# Patient Record
Sex: FEMALE | Race: WHITE | Employment: FULL TIME | URBAN - NONMETROPOLITAN AREA
[De-identification: names, ages, dates, MRNs, and addresses within clinical notes are randomized per-mention and may not be internally consistent; named-entity substitution may affect disease eponyms.]

---

## 2021-07-15 ENCOUNTER — TELEPHONE (OUTPATIENT)
Dept: OBGYN CLINIC | Age: 26
End: 2021-07-15

## 2021-07-15 ENCOUNTER — OFFICE VISIT (OUTPATIENT)
Dept: OBGYN CLINIC | Age: 26
End: 2021-07-15
Payer: COMMERCIAL

## 2021-07-15 VITALS — WEIGHT: 143.2 LBS | DIASTOLIC BLOOD PRESSURE: 78 MMHG | SYSTOLIC BLOOD PRESSURE: 124 MMHG

## 2021-07-15 DIAGNOSIS — N94.6 DYSMENORRHEA: ICD-10-CM

## 2021-07-15 DIAGNOSIS — N92.1 MENORRHAGIA WITH IRREGULAR CYCLE: Primary | ICD-10-CM

## 2021-07-15 PROCEDURE — 1036F TOBACCO NON-USER: CPT | Performed by: NURSE PRACTITIONER

## 2021-07-15 PROCEDURE — 99203 OFFICE O/P NEW LOW 30 MIN: CPT | Performed by: NURSE PRACTITIONER

## 2021-07-15 PROCEDURE — G8427 DOCREV CUR MEDS BY ELIG CLIN: HCPCS | Performed by: NURSE PRACTITIONER

## 2021-07-15 PROCEDURE — G8421 BMI NOT CALCULATED: HCPCS | Performed by: NURSE PRACTITIONER

## 2021-07-15 RX ORDER — OXYBUTYNIN CHLORIDE 15 MG/1
TABLET, EXTENDED RELEASE ORAL
COMMUNITY
Start: 2021-06-11

## 2021-07-15 RX ORDER — LEVONORGESTREL / ETHINYL ESTRADIOL 0.15-0.03
KIT ORAL
COMMUNITY
Start: 2021-07-06 | End: 2021-08-10 | Stop reason: ALTCHOICE

## 2021-07-15 NOTE — PROGRESS NOTES
PROBLEM VISIT     Date of service: 7/15/2021    Eliza Ramsey  Is a 23 y.o.  female    PT's PCP is: No primary care provider on file. : 1995                                             Subjective:       Patient's last menstrual period was 2021. OB History    Para Term  AB Living   0 0 0 0 0 0   SAB TAB Ectopic Molar Multiple Live Births   0 0 0 0 0 0        Social History     Tobacco Use   Smoking Status Never Smoker   Smokeless Tobacco Never Used        Social History     Substance and Sexual Activity   Alcohol Use Yes    Comment: occ       Allergies: Penicillins      Current Outpatient Medications:     oxybutynin (DITROPAN XL) 15 MG extended release tablet, TAKE 1 TABLET BY MOUTH ONCE DAILY, Disp: , Rfl:   Senora Gills 0.15-0.03 MG per tablet, TAKE 1 TABLET BY MOUTH ONCE DAILY, Disp: , Rfl:     Social History     Substance and Sexual Activity   Sexual Activity Yes    Partners: Male    Birth control/protection: OCP     Chief Complaint   Patient presents with    Other     Discuss Mirena IUD for cycle control. Reports menses every 2 mos and heavy bleeding without OCP. PE:  Vital Signs  Blood pressure 124/78, weight 143 lb 3.2 oz (65 kg), last menstrual period 2021. HPI: Patient presents today to discuss Mirena IUD for cycle control. Reports without OCP menses is irregular and heavy. PT denies fever, chills, nausea and vomiting       Review of Systems   Constitutional: Negative. Genitourinary: Positive for menstrual problem. Negative for dyspareunia, dysuria, frequency, pelvic pain, vaginal bleeding and vaginal discharge. Physical Exam  Constitutional:       Appearance: Normal appearance. HENT:      Head: Normocephalic. Pulmonary:      Effort: Pulmonary effort is normal.   Musculoskeletal:         General: Normal range of motion. Neurological:      General: No focal deficit present. Mental Status: She is alert.    Psychiatric: Mood and Affect: Mood normal.         Behavior: Behavior normal.         Assessment and Plan          Diagnosis Orders   1. Menorrhagia with irregular cycle  hCG, Quantitative, Pregnancy   2. Dysmenorrhea         Discussed Mirena IUD- MOA, common side effects and bleeding patterns, risks/benefits, procedure. She is aware she will need hcg the day prior to menses and will plan for insert day 5-9 of cycle. I am having Shannan Garza maintain her oxybutynin and Jolessa. Return for IUD insert. There are no Patient Instructions on file for this visit.     PRINCE Villar NP,7/15/2021 3:10 PM

## 2021-07-26 NOTE — TELEPHONE ENCOUNTER
Prescription Request Form faxed to [facility] with confirmation of fax received.

## 2021-08-02 NOTE — TELEPHONE ENCOUNTER
Pt returned call and is aware to call office on cycle day 1 to schedule insertion. She is also aware of need for SPT the day prior to insertion.

## 2021-08-10 ENCOUNTER — PROCEDURE VISIT (OUTPATIENT)
Dept: OBGYN CLINIC | Age: 26
End: 2021-08-10
Payer: COMMERCIAL

## 2021-08-10 ENCOUNTER — HOSPITAL ENCOUNTER (OUTPATIENT)
Age: 26
Setting detail: SPECIMEN
Discharge: HOME | End: 2021-08-10
Payer: COMMERCIAL

## 2021-08-10 VITALS — WEIGHT: 143.8 LBS | DIASTOLIC BLOOD PRESSURE: 78 MMHG | SYSTOLIC BLOOD PRESSURE: 120 MMHG

## 2021-08-10 DIAGNOSIS — N92.1 MENORRHAGIA WITH IRREGULAR CYCLE: Primary | ICD-10-CM

## 2021-08-10 DIAGNOSIS — N94.6 DYSMENORRHEA: ICD-10-CM

## 2021-08-10 DIAGNOSIS — Z11.3 SCREEN FOR STD (SEXUALLY TRANSMITTED DISEASE): Primary | ICD-10-CM

## 2021-08-10 DIAGNOSIS — Z30.430 ENCOUNTER FOR INSERTION OF INTRAUTERINE CONTRACEPTIVE DEVICE (IUD): ICD-10-CM

## 2021-08-10 PROCEDURE — 58300 INSERT INTRAUTERINE DEVICE: CPT | Performed by: NURSE PRACTITIONER

## 2021-08-10 NOTE — PROGRESS NOTES
The patient is a 23 y.o. female that presents for IUD insertion for menorrhagia with irregular cycle and dysmenorrhea     OB History        0    Para   0    Term   0       0    AB   0    Living   0       SAB   0    TAB   0    Ectopic   0    Molar   0    Multiple   0    Live Births   0                Allergies: Penicillins    Vitals: Blood pressure 120/78, weight 143 lb 12.8 oz (65.2 kg), last menstrual period 2021. Premedicated with Motrin 800 mg: No    Cytotec 200mcg:No    UCG: negative    Consent signed:  Yes    PROCEDURE:    Mirena, Lot # [id number], Expiration date 2023      Bimanual exam: anteverted    Cervix cleansed with: Hibiclens-Alcoholx3    Tenaculum applied: No     Uterus sounded: 7cm    Mirena, Lot # [id number], Expiration date 2023 inserted without difficulty. IUD strings trimmed to 3 cm. Assessment:   Diagnosis Orders   1. Menorrhagia with irregular cycle     2. Dysmenorrhea     3. Encounter for insertion of intrauterine contraceptive device (IUD)           Plan:  Education on IUD  Abstain from intercourse for 72 hours  Motrin 800 mg Q 8 hours PRN  RTO one month for ultrasound for sting check. See [facility] for lot # and NDC #    Return in about 4 weeks (around 2021) for string check .     PRINCE Peters NP,8/10/2021 3:58 PM

## 2021-08-11 DIAGNOSIS — Z11.3 SCREEN FOR STD (SEXUALLY TRANSMITTED DISEASE): ICD-10-CM

## 2021-08-12 LAB
C TRACH DNA GENITAL QL NAA+PROBE: NEGATIVE
N. GONORRHOEAE DNA: NEGATIVE
SPECIMEN DESCRIPTION: NORMAL

## 2021-09-01 DIAGNOSIS — N92.1 MENORRHAGIA WITH IRREGULAR CYCLE: ICD-10-CM

## 2021-09-20 ENCOUNTER — OFFICE VISIT (OUTPATIENT)
Dept: OBGYN CLINIC | Age: 26
End: 2021-09-20
Payer: COMMERCIAL

## 2021-09-20 VITALS
BODY MASS INDEX: 23.27 KG/M2 | DIASTOLIC BLOOD PRESSURE: 67 MMHG | HEIGHT: 66 IN | SYSTOLIC BLOOD PRESSURE: 107 MMHG | WEIGHT: 144.8 LBS

## 2021-09-20 DIAGNOSIS — Z30.431 ENCOUNTER FOR ROUTINE CHECKING OF INTRAUTERINE CONTRACEPTIVE DEVICE (IUD): Primary | ICD-10-CM

## 2021-09-20 PROCEDURE — G8420 CALC BMI NORM PARAMETERS: HCPCS | Performed by: NURSE PRACTITIONER

## 2021-09-20 PROCEDURE — 99213 OFFICE O/P EST LOW 20 MIN: CPT | Performed by: NURSE PRACTITIONER

## 2021-09-20 PROCEDURE — G8427 DOCREV CUR MEDS BY ELIG CLIN: HCPCS | Performed by: NURSE PRACTITIONER

## 2021-09-20 PROCEDURE — 1036F TOBACCO NON-USER: CPT | Performed by: NURSE PRACTITIONER

## 2021-09-20 NOTE — PROGRESS NOTES
normal.     chaperone: not present     Assessment and Plan          Diagnosis Orders   1. Encounter for routine checking of intrauterine contraceptive device (IUD)               I am having Aleida Srinivas ONEAL Greg maintain her oxybutynin and Mirena (52 MG). Return in about 4 months (around 1/20/2022) for yearly. There are no Patient Instructions on file for this visit.     PRINCE Cooper NP,9/21/2021 7:37 AM

## 2022-01-19 ENCOUNTER — OFFICE VISIT (OUTPATIENT)
Dept: OBGYN CLINIC | Age: 27
End: 2022-01-19
Payer: COMMERCIAL

## 2022-01-19 VITALS — SYSTOLIC BLOOD PRESSURE: 119 MMHG | WEIGHT: 151.6 LBS | BODY MASS INDEX: 24.47 KG/M2 | DIASTOLIC BLOOD PRESSURE: 71 MMHG

## 2022-01-19 DIAGNOSIS — Z01.419 WOMEN'S ANNUAL ROUTINE GYNECOLOGICAL EXAMINATION: Primary | ICD-10-CM

## 2022-01-19 DIAGNOSIS — Z30.431 ENCOUNTER FOR ROUTINE CHECKING OF INTRAUTERINE CONTRACEPTIVE DEVICE (IUD): ICD-10-CM

## 2022-01-19 PROCEDURE — 99395 PREV VISIT EST AGE 18-39: CPT | Performed by: NURSE PRACTITIONER

## 2022-01-19 PROCEDURE — G8484 FLU IMMUNIZE NO ADMIN: HCPCS | Performed by: NURSE PRACTITIONER

## 2022-01-19 ASSESSMENT — ENCOUNTER SYMPTOMS
DIARRHEA: 0
SHORTNESS OF BREATH: 0
ABDOMINAL PAIN: 0
CONSTIPATION: 0

## 2022-01-19 NOTE — PROGRESS NOTES
YEARLY PHYSICAL    Date of service: 2022    Jane Villa  Is a 27 y.o.  female    PT's PCP is: No primary care provider on file. : 1995                                         Chaperone for Intimate Exam   Chaperone was offered as part of the rooming process. Patient declined and agrees to continue with exam without a chaperone.  Chaperone: n/a      Subjective:       No LMP recorded. (Menstrual status: Other - See Notes). Are your menses regular: irregular spotting with Mirena     OB History    Para Term  AB Living   0 0 0 0 0 0   SAB IAB Ectopic Molar Multiple Live Births   0 0 0 0 0 0        Social History     Tobacco Use   Smoking Status Never Smoker   Smokeless Tobacco Never Used        Social History     Substance and Sexual Activity   Alcohol Use Yes    Comment: occ       Family History   Problem Relation Age of Onset    Breast Cancer Maternal Grandmother 48    Hypertension Mother        Any family history of breast or ovarian cancer: Yes    Any family history of blood clots: No      Allergies: Penicillins      Current Outpatient Medications:     MIRENA, 52 MG, IUD 52 mg, , Disp: , Rfl:     oxybutynin (DITROPAN XL) 15 MG extended release tablet, TAKE 1 TABLET BY MOUTH ONCE DAILY, Disp: , Rfl:     Social History     Substance and Sexual Activity   Sexual Activity Yes    Partners: Male    Birth control/protection: I.U.D. Any bleeding or pain with intercourse: No    Last Yearly:  ?     Last pap: 2021    Last HPV: reflex if ASCUS     Last Mammogram: n/a    Do you do self breast exams: encouraged monthly SBE     Past Medical History:   Diagnosis Date    Epilepsy ([facility] [address]     Overactive bladder        Past Surgical History:   Procedure Laterality Date    TONSILLECTOMY AND ADENOIDECTOMY         Family History   Problem Relation Age of Onset    Breast Cancer Maternal Grandmother 48    Hypertension Mother        Chief Complaint   Patient presents with    Gynecologic Exam     Last pap 1/14/2021. Intermittent spotting with Mirena. Feeling well, voices no concerns. PE:  Vital Signs  Blood pressure 119/71, weight 151 lb 9.6 oz (68.8 kg). Estimated body mass index is 24.47 kg/m² as calculated from the following:    Height as of 9/20/21: 5' 6\" (1.676 m). Weight as of this encounter: 151 lb 9.6 oz (68.8 kg). HPI: Patient presents today for annual exam. Feeling well, voices no concerns. Denies breast/pelvic pain. Negative pap 1/2021. Irregular spotting with Mirena, continues to lessen the longer she has IUD in place        Review of Systems   Constitutional: Negative for chills, fatigue and fever. Respiratory: Negative for shortness of breath. Cardiovascular: Negative for chest pain. Gastrointestinal: Negative for abdominal pain, constipation and diarrhea. Genitourinary: Negative for dysuria, enuresis, frequency, menstrual problem, pelvic pain, urgency and vaginal bleeding. Neurological: Negative for dizziness, light-headedness and headaches. Physical Exam  Constitutional:       General: She is not in acute distress. Appearance: Normal appearance. She is not ill-appearing. Genitourinary:      Vulva normal.      No vaginal discharge. Right Adnexa: not tender. Left Adnexa: not tender. IUD strings visualized. Uterus is not tender. Breasts:      Right: No mass, nipple discharge, skin change or tenderness. Left: No mass, nipple discharge, skin change or tenderness. HENT:      Head: Normocephalic. Cardiovascular:      Rate and Rhythm: Normal rate and regular rhythm. Pulmonary:      Effort: Pulmonary effort is normal.      Breath sounds: Normal breath sounds. Abdominal:      Palpations: Abdomen is soft. Tenderness: There is no abdominal tenderness. There is no guarding or rebound.    Musculoskeletal:         General: Normal range of motion. Neurological:      General: No focal deficit present. Mental Status: She is alert. Psychiatric:         Mood and Affect: Mood normal.         Behavior: Behavior normal.                               Assessment and Plan          Diagnosis Orders   1. Women's annual routine gynecological examination     2. Encounter for routine checking of intrauterine contraceptive device (IUD)         Repeat Annual every 1 year  Cervical Cytology Evaluation begins at 24years old. If Negative Cytology, Follow-up screening per current guidelines. Mammograms every 1year. If 35 yo and last mammogram was negative. Routine healthmaintenance per patients PCP. I am having Nazia Garza maintain her oxybutynin and Mirena (52 MG). Return in about 1 year (around 1/19/2023) for yearly. She was also counseled on her preventative health maintenance recommendations and follow-up. There are no Patient Instructions on file for this visit.     PRINCE Villegas - NP,1/19/2022 4:12 PM

## 2022-02-24 ENCOUNTER — OFFICE VISIT (OUTPATIENT)
Dept: OBGYN CLINIC | Age: 27
End: 2022-02-24
Payer: COMMERCIAL

## 2022-02-24 VITALS — BODY MASS INDEX: 24.52 KG/M2 | SYSTOLIC BLOOD PRESSURE: 123 MMHG | WEIGHT: 151.9 LBS | DIASTOLIC BLOOD PRESSURE: 78 MMHG

## 2022-02-24 DIAGNOSIS — R14.0 ABDOMINAL BLOATING: Primary | ICD-10-CM

## 2022-02-24 PROCEDURE — G8484 FLU IMMUNIZE NO ADMIN: HCPCS | Performed by: NURSE PRACTITIONER

## 2022-02-24 PROCEDURE — G8420 CALC BMI NORM PARAMETERS: HCPCS | Performed by: NURSE PRACTITIONER

## 2022-02-24 PROCEDURE — 1036F TOBACCO NON-USER: CPT | Performed by: NURSE PRACTITIONER

## 2022-02-24 PROCEDURE — 99213 OFFICE O/P EST LOW 20 MIN: CPT | Performed by: NURSE PRACTITIONER

## 2022-02-24 PROCEDURE — G8427 DOCREV CUR MEDS BY ELIG CLIN: HCPCS | Performed by: NURSE PRACTITIONER

## 2022-02-24 NOTE — PROGRESS NOTES
PROBLEM VISIT     Date of service: 2022    María Elena Nurse  Is a 27 y.o. female    PT's PCP is: No primary care provider on file. : 1995                                             Subjective:       Patient's last menstrual period was 02/15/2022. OB History    Para Term  AB Living   0 0 0 0 0 0   SAB IAB Ectopic Molar Multiple Live Births   0 0 0 0 0 0        Social History     Tobacco Use   Smoking Status Never Smoker   Smokeless Tobacco Never Used        Social History     Substance and Sexual Activity   Alcohol Use Yes    Comment: occ       Allergies: Penicillins      Current Outpatient Medications:     MIRENA, 46 MG, IUD 52 mg, , Disp: , Rfl:     oxybutynin (DITROPAN XL) 15 MG extended release tablet, TAKE 1 TABLET BY MOUTH ONCE DAILY, Disp: , Rfl:     Social History     Substance and Sexual Activity   Sexual Activity Yes    Partners: Male    Birth control/protection: I.U.D. Chief Complaint   Patient presents with    Other     C/O intermittent abdominal bloating x 3-4 mos. Has tried probiotics and changing diet and nothing seems to help. PE:  Vital Signs  Blood pressure 123/78, weight 151 lb 14.4 oz (68.9 kg), last menstrual period 02/15/2022. HPI: Patient presents today with complaints of intermittent abdominal bloating for the past 3-4 months. States the bloating causes a generalized discomfort in abdomen. Has tried dietary changes and probiotics without relief. The bloating seems to onset following meals and continue until the next day. Reports normal bowel movements. Concerned this may be related to Mirena, which was placed 7 months ago. PT denies fever, chills, nausea and vomiting       Review of Systems   Constitutional: Negative. Gastrointestinal: Positive for abdominal distention (bloating). Negative for constipation, diarrhea, nausea and vomiting. Genitourinary: Negative.         Physical Exam  Constitutional:       Appearance: Normal appearance. HENT:      Head: Normocephalic. Pulmonary:      Effort: Pulmonary effort is normal.   Musculoskeletal:         General: Normal range of motion. Neurological:      General: No focal deficit present. Mental Status: She is alert. Psychiatric:         Mood and Affect: Mood normal.         Behavior: Behavior normal.         Assessment and Plan          Diagnosis Orders   1. Abdominal bloating         Discussed food journal and elimination diet. Encouraged to avoid common triggers dairy, gluten. Discussed there is potential there can be hormonal influence but the only way to know is removing Mirena. Patient had tolerated OCP's prior to use of Mirena. Patient desires referral to gastro       I am having Obinna Garza maintain her oxybutynin and Mirena (52 MG). Return if symptoms worsen or fail to improve. There are no Patient Instructions on file for this visit. Over 50% of time spent on counseling and care coordination on: see assessment and plan,  She was also counseled on her preventative health maintenance recommendations and follow-up.         FF time: 20 min      PRINCE Randall NP,2/28/2022 8:42 AM

## 2022-02-28 ENCOUNTER — TELEPHONE (OUTPATIENT)
Dept: OBGYN CLINIC | Age: 27
End: 2022-02-28

## 2022-02-28 ASSESSMENT — ENCOUNTER SYMPTOMS
ABDOMINAL DISTENTION: 1
DIARRHEA: 0
VOMITING: 0
CONSTIPATION: 0
NAUSEA: 0

## 2023-01-19 NOTE — PROGRESS NOTES
YEARLY PHYSICAL    Date of service: 2023    Liat Cohen  Is a 24 y.o.  single female    PT's PCP is: No primary care provider on file. : 1995                                         Chaperone for Intimate Exam  Chaperone was offered as part of the rooming process. Patient declined and agrees to continue with exam without a chaperone. Chaperone: n/a      Subjective:       Patient's last menstrual period was 2023. Are your menses regular: yes    OB History    Para Term  AB Living   0 0 0 0 0 0   SAB IAB Ectopic Molar Multiple Live Births   0 0 0 0 0 0        Social History     Tobacco Use   Smoking Status Never   Smokeless Tobacco Never        Social History     Substance and Sexual Activity   Alcohol Use Yes    Alcohol/week: 2.0 standard drinks    Types: 2 Glasses of wine per week    Comment: occ       Family History   Problem Relation Age of Onset    Breast Cancer Maternal Grandmother 48    Hypertension Mother        Any family history of breast or ovarian cancer: Yes    Any family history of blood clots: No      Allergies: Penicillins      Current Outpatient Medications:     MIRENA, 52 MG, IUD 52 mg, , Disp: , Rfl:     oxybutynin (DITROPAN XL) 15 MG extended release tablet, TAKE 1 TABLET BY MOUTH ONCE DAILY (Patient not taking: Reported on 2023), Disp: , Rfl:     Social History     Substance and Sexual Activity   Sexual Activity Yes    Partners: Male    Birth control/protection: I.U.D.        Any bleeding or pain with intercourse: Yes    Last Yearly:  24    Last pap: 21-neg    Last HPV: n/a    Do you do self breast exams: Yes    Past Medical History:   Diagnosis Date    Epilepsy ([facility] [address]     Overactive bladder        Past Surgical History:   Procedure Laterality Date    TONSILLECTOMY AND ADENOIDECTOMY         Family History   Problem Relation Age of Onset    Breast Cancer Maternal Grandmother 48    Hypertension Mother        Chief Complaint   Patient presents with    Annual Exam     Last pap 1/14/21. Pelvic Pain     C/O intermittent pelvic pain with intercourse. PE:  Vital Signs  Blood pressure 113/68, height 5' 6\" (1.676 m), weight 155 lb 3.2 oz (70.4 kg), last menstrual period 01/20/2023. Estimated body mass index is 25.05 kg/m² as calculated from the following:    Height as of this encounter: 5' 6\" (1.676 m). Weight as of this encounter: 155 lb 3.2 oz (70.4 kg). NURSE: nandini    HPI: Patient presents today for annual exam. Feeling well. Denies breast/pelvic pain. Negative pap 1/2021. Reports monthly menses with IUD. Does admit to cramping with menses but overall happy with IUD device. Reports intermittent, positional pelvic pain with intercourse. Recently engaged, planning wedding for Labor Day weekend. Review of Systems   Constitutional:  Negative for chills, fatigue and fever. Respiratory:  Negative for shortness of breath. Cardiovascular:  Negative for chest pain. Gastrointestinal:  Negative for abdominal pain, constipation and diarrhea. Genitourinary:  Positive for dyspareunia (intermittent, positional) and menstrual problem (cramping). Negative for dysuria, enuresis, frequency, pelvic pain, urgency, vaginal bleeding, vaginal discharge and vaginal pain. Neurological:  Negative for dizziness, light-headedness and headaches. Physical Exam  Constitutional:       General: She is not in acute distress. Appearance: Normal appearance. She is not ill-appearing. Genitourinary:      Vulva, bladder and urethral meatus normal.      No lesions in the vagina. Right Labia: No rash or lesions. Left Labia: No lesions or rash. No vaginal discharge. No vaginal prolapse present. No vaginal atrophy present. Right Adnexa: not tender and no mass present. Left Adnexa: not tender and no mass present.      No cervical motion tenderness, discharge or friability. IUD strings visualized. No parametrium nodularity present. Uterus is not enlarged or tender. No uterine mass detected. No urethral prolapse, tenderness or mass present. Breasts:     Right: No mass, nipple discharge, skin change or tenderness. Left: No mass, nipple discharge, skin change or tenderness. HENT:      Head: Normocephalic and atraumatic. Eyes:      Extraocular Movements: Extraocular movements intact. Pupils: Pupils are equal, round, and reactive to light. Cardiovascular:      Rate and Rhythm: Normal rate. Pulmonary:      Effort: Pulmonary effort is normal.   Abdominal:      Palpations: Abdomen is soft. Tenderness: There is no abdominal tenderness. There is no guarding or rebound. Musculoskeletal:         General: Normal range of motion. Neurological:      General: No focal deficit present. Mental Status: She is alert and oriented to person, place, and time. Skin:     General: Skin is warm and dry. Psychiatric:         Mood and Affect: Mood normal.         Behavior: Behavior normal.                          Assessment and Plan          Diagnosis Orders   1. Women's annual routine gynecological examination            Repeat Annual every 1 year  Cervical Cytology Evaluation begins at 24years old. If Negative Cytology, Follow-up screening per current guidelines. Mammograms every 1year. If 35 yo and last mammogram was negative. Routine healthmaintenance per patients PCP. Discussed trial of Mortin 800 mg TID with onset of menses to help with cramping     Patient aware to call if pelvic pain with SI because more frequent and not provoked. .       I am having Ty Garza maintain her oxybutynin and Mirena (52 MG). Return in about 1 year (around 1/23/2024) for yearly. She was also counseled on her preventative health maintenance recommendations and follow-up.      There are no Patient Instructions on file for this visit.    PRINCE Little - NP,1/24/2023 8:16 AM

## 2023-01-23 ENCOUNTER — OFFICE VISIT (OUTPATIENT)
Dept: OBGYN CLINIC | Age: 28
End: 2023-01-23
Payer: COMMERCIAL

## 2023-01-23 VITALS
BODY MASS INDEX: 24.94 KG/M2 | DIASTOLIC BLOOD PRESSURE: 68 MMHG | WEIGHT: 155.2 LBS | HEIGHT: 66 IN | SYSTOLIC BLOOD PRESSURE: 113 MMHG

## 2023-01-23 DIAGNOSIS — Z01.419 WOMEN'S ANNUAL ROUTINE GYNECOLOGICAL EXAMINATION: Primary | ICD-10-CM

## 2023-01-23 PROCEDURE — 99395 PREV VISIT EST AGE 18-39: CPT | Performed by: NURSE PRACTITIONER

## 2023-01-23 PROCEDURE — G8484 FLU IMMUNIZE NO ADMIN: HCPCS | Performed by: NURSE PRACTITIONER

## 2023-01-24 ASSESSMENT — ENCOUNTER SYMPTOMS
ABDOMINAL PAIN: 0
SHORTNESS OF BREATH: 0
CONSTIPATION: 0
DIARRHEA: 0